# Patient Record
Sex: FEMALE | Race: BLACK OR AFRICAN AMERICAN | Employment: FULL TIME | URBAN - METROPOLITAN AREA
[De-identification: names, ages, dates, MRNs, and addresses within clinical notes are randomized per-mention and may not be internally consistent; named-entity substitution may affect disease eponyms.]

---

## 2017-12-17 ENCOUNTER — HOSPITAL ENCOUNTER (EMERGENCY)
Age: 32
Discharge: HOME | End: 2017-12-17
Attending: EMERGENCY MEDICINE
Payer: SELF-PAY

## 2017-12-17 ENCOUNTER — APPOINTMENT (OUTPATIENT)
Dept: GENERAL RADIOLOGY | Age: 32
End: 2017-12-17
Attending: PHYSICIAN ASSISTANT
Payer: SELF-PAY

## 2017-12-17 VITALS
HEART RATE: 86 BPM | TEMPERATURE: 99.2 F | DIASTOLIC BLOOD PRESSURE: 74 MMHG | RESPIRATION RATE: 18 BRPM | BODY MASS INDEX: 25.03 KG/M2 | OXYGEN SATURATION: 100 % | HEIGHT: 69 IN | SYSTOLIC BLOOD PRESSURE: 120 MMHG | WEIGHT: 169 LBS

## 2017-12-17 DIAGNOSIS — J20.9 ACUTE BRONCHITIS, UNSPECIFIED ORGANISM: Primary | ICD-10-CM

## 2017-12-17 LAB
FLUAV AG NPH QL IA: NEGATIVE
FLUBV AG NOSE QL IA: NEGATIVE

## 2017-12-17 PROCEDURE — 93005 ELECTROCARDIOGRAM TRACING: CPT

## 2017-12-17 PROCEDURE — 74011250637 HC RX REV CODE- 250/637: Performed by: PHYSICIAN ASSISTANT

## 2017-12-17 PROCEDURE — 77030029684 HC NEB SM VOL KT MONA -A

## 2017-12-17 PROCEDURE — 87804 INFLUENZA ASSAY W/OPTIC: CPT | Performed by: PHYSICIAN ASSISTANT

## 2017-12-17 PROCEDURE — 94640 AIRWAY INHALATION TREATMENT: CPT

## 2017-12-17 PROCEDURE — 74011000250 HC RX REV CODE- 250: Performed by: PHYSICIAN ASSISTANT

## 2017-12-17 PROCEDURE — 71020 XR CHEST PA LAT: CPT

## 2017-12-17 PROCEDURE — 99283 EMERGENCY DEPT VISIT LOW MDM: CPT

## 2017-12-17 RX ORDER — ALBUTEROL SULFATE 90 UG/1
2 AEROSOL, METERED RESPIRATORY (INHALATION)
Qty: 1 INHALER | Refills: 0 | Status: SHIPPED | OUTPATIENT
Start: 2017-12-17 | End: 2021-07-29

## 2017-12-17 RX ORDER — BUTALBITAL, ACETAMINOPHEN AND CAFFEINE 50; 325; 40 MG/1; MG/1; MG/1
1 TABLET ORAL
Status: COMPLETED | OUTPATIENT
Start: 2017-12-17 | End: 2017-12-17

## 2017-12-17 RX ORDER — IPRATROPIUM BROMIDE AND ALBUTEROL SULFATE 2.5; .5 MG/3ML; MG/3ML
3 SOLUTION RESPIRATORY (INHALATION)
Status: COMPLETED | OUTPATIENT
Start: 2017-12-17 | End: 2017-12-17

## 2017-12-17 RX ORDER — CODEINE PHOSPHATE AND GUAIFENESIN 10; 100 MG/5ML; MG/5ML
5 SOLUTION ORAL
Qty: 120 ML | Refills: 0 | Status: SHIPPED | OUTPATIENT
Start: 2017-12-17 | End: 2021-07-29

## 2017-12-17 RX ADMIN — IPRATROPIUM BROMIDE AND ALBUTEROL SULFATE 3 ML: .5; 3 SOLUTION RESPIRATORY (INHALATION) at 17:28

## 2017-12-17 RX ADMIN — BUTALBITAL, ACETAMINOPHEN AND CAFFEINE 1 TABLET: 50; 325; 40 TABLET ORAL at 17:27

## 2017-12-17 NOTE — DISCHARGE INSTRUCTIONS
Bronchitis: Care Instructions  Your Care Instructions    Bronchitis is inflammation of the bronchial tubes, which carry air to the lungs. The tubes swell and produce mucus, or phlegm. The mucus and inflamed bronchial tubes make you cough. You may have trouble breathing. Most cases of bronchitis are caused by viruses like those that cause colds. Antibiotics usually do not help and they may be harmful. Bronchitis usually develops rapidly and lasts about 2 to 3 weeks in otherwise healthy people. Follow-up care is a key part of your treatment and safety. Be sure to make and go to all appointments, and call your doctor if you are having problems. It's also a good idea to know your test results and keep a list of the medicines you take. How can you care for yourself at home? · Take all medicines exactly as prescribed. Call your doctor if you think you are having a problem with your medicine. · Get some extra rest.  · Take an over-the-counter pain medicine, such as acetaminophen (Tylenol), ibuprofen (Advil, Motrin), or naproxen (Aleve) to reduce fever and relieve body aches. Read and follow all instructions on the label. · Do not take two or more pain medicines at the same time unless the doctor told you to. Many pain medicines have acetaminophen, which is Tylenol. Too much acetaminophen (Tylenol) can be harmful. · Take an over-the-counter cough medicine that contains dextromethorphan to help quiet a dry, hacking cough so that you can sleep. Avoid cough medicines that have more than one active ingredient. Read and follow all instructions on the label. · Breathe moist air from a humidifier, hot shower, or sink filled with hot water. The heat and moisture will thin mucus so you can cough it out. · Do not smoke. Smoking can make bronchitis worse. If you need help quitting, talk to your doctor about stop-smoking programs and medicines. These can increase your chances of quitting for good.   When should you call for help? Call 911 anytime you think you may need emergency care. For example, call if:  ? · You have severe trouble breathing. ?Call your doctor now or seek immediate medical care if:  ? · You have new or worse trouble breathing. ? · You cough up dark brown or bloody mucus (sputum). ? · You have a new or higher fever. ? · You have a new rash. ? Watch closely for changes in your health, and be sure to contact your doctor if:  ? · You cough more deeply or more often, especially if you notice more mucus or a change in the color of your mucus. ? · You are not getting better as expected. Where can you learn more? Go to http://asia-charles.info/. Enter H333 in the search box to learn more about \"Bronchitis: Care Instructions. \"  Current as of: May 12, 2017  Content Version: 11.4  © 5023-9060 [employer]. Care instructions adapted under license by [employer] (which disclaims liability or warranty for this information). If you have questions about a medical condition or this instruction, always ask your healthcare professional. [address] any warranty or liability for your use of this information.
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
## 2017-12-17 NOTE — LETTER
[facility] EMERGENCY DEPT 
[address] [place] [id number] 
[phone] Work/School Note Date: 12/17/2017 To Whom It May concern: 
 
Irina Arreola was seen and treated today in the emergency room by the following provider(s): 
Attending Provider: Juli Srivastava MD 
Physician Assistant: Len Rabago PA-C. Irina Arreola may return to work on 12/20/17. Sincerely, Len Rabago PA-C

## 2017-12-18 LAB
ATRIAL RATE: 84 BPM
CALCULATED P AXIS, ECG09: 50 DEGREES
CALCULATED R AXIS, ECG10: 57 DEGREES
CALCULATED T AXIS, ECG11: 27 DEGREES
DIAGNOSIS, 93000: NORMAL
P-R INTERVAL, ECG05: 154 MS
Q-T INTERVAL, ECG07: 336 MS
QRS DURATION, ECG06: 68 MS
QTC CALCULATION (BEZET), ECG08: 397 MS
VENTRICULAR RATE, ECG03: 84 BPM

## 2018-11-08 ENCOUNTER — HOSPITAL ENCOUNTER (EMERGENCY)
Age: 33
Discharge: HOME | End: 2018-11-08
Attending: EMERGENCY MEDICINE
Payer: SELF-PAY

## 2018-11-08 ENCOUNTER — APPOINTMENT (OUTPATIENT)
Dept: CT IMAGING | Age: 33
End: 2018-11-08
Attending: EMERGENCY MEDICINE
Payer: SELF-PAY

## 2018-11-08 VITALS
DIASTOLIC BLOOD PRESSURE: 72 MMHG | HEART RATE: 76 BPM | OXYGEN SATURATION: 100 % | SYSTOLIC BLOOD PRESSURE: 121 MMHG | RESPIRATION RATE: 16 BRPM | TEMPERATURE: 98.8 F

## 2018-11-08 DIAGNOSIS — R51.9 NONINTRACTABLE HEADACHE, UNSPECIFIED CHRONICITY PATTERN, UNSPECIFIED HEADACHE TYPE: Primary | ICD-10-CM

## 2018-11-08 LAB — HCG SERPL QL: NEGATIVE

## 2018-11-08 PROCEDURE — 99284 EMERGENCY DEPT VISIT MOD MDM: CPT

## 2018-11-08 PROCEDURE — 96361 HYDRATE IV INFUSION ADD-ON: CPT

## 2018-11-08 PROCEDURE — 74011250636 HC RX REV CODE- 250/636: Performed by: EMERGENCY MEDICINE

## 2018-11-08 PROCEDURE — 70450 CT HEAD/BRAIN W/O DYE: CPT

## 2018-11-08 PROCEDURE — 96374 THER/PROPH/DIAG INJ IV PUSH: CPT

## 2018-11-08 PROCEDURE — 84703 CHORIONIC GONADOTROPIN ASSAY: CPT

## 2018-11-08 PROCEDURE — 96375 TX/PRO/DX INJ NEW DRUG ADDON: CPT

## 2018-11-08 RX ORDER — METOCLOPRAMIDE HYDROCHLORIDE 5 MG/ML
10 INJECTION INTRAMUSCULAR; INTRAVENOUS ONCE
Status: COMPLETED | OUTPATIENT
Start: 2018-11-08 | End: 2018-11-08

## 2018-11-08 RX ORDER — DIPHENHYDRAMINE HYDROCHLORIDE 50 MG/ML
25 INJECTION, SOLUTION INTRAMUSCULAR; INTRAVENOUS ONCE
Status: COMPLETED | OUTPATIENT
Start: 2018-11-08 | End: 2018-11-08

## 2018-11-08 RX ORDER — METOCLOPRAMIDE HYDROCHLORIDE 5 MG/ML
5 INJECTION INTRAMUSCULAR; INTRAVENOUS EVERY 6 HOURS
Status: DISCONTINUED | OUTPATIENT
Start: 2018-11-08 | End: 2018-11-08

## 2018-11-08 RX ADMIN — METOCLOPRAMIDE 10 MG: 5 INJECTION, SOLUTION INTRAMUSCULAR; INTRAVENOUS at 08:49

## 2018-11-08 RX ADMIN — SODIUM CHLORIDE 1000 ML: 900 INJECTION, SOLUTION INTRAVENOUS at 08:46

## 2018-11-08 RX ADMIN — DIPHENHYDRAMINE HYDROCHLORIDE 25 MG: 50 INJECTION, SOLUTION INTRAMUSCULAR; INTRAVENOUS at 08:47

## 2018-11-08 NOTE — DISCHARGE INSTRUCTIONS

## 2018-11-08 NOTE — ED NOTES
I have reviewed discharge instructions with the patient. The patient verbalized understanding, pain reports that pain level is tolerable, given work note.
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
## 2018-11-08 NOTE — ED TRIAGE NOTES
\"I have a migraine and it started 2 days ago and it makes me vomit. It's the worse headache I've ever had. \"

## 2018-11-08 NOTE — LETTER
NOTIFICATION RETURN TO WORK / SCHOOL 
 
11/8/2018 10:10 AM 
 
Ms. Bharat Ramos 
[address] [facility] [address] [id number] To Whom It May Concern: 
 
Bharat Ramos is currently under the care of [facility] EMERGENCY DEPT. She will return to work/school on: 11/9/2018 If there are questions or concerns please have the patient contact our office.  
 
 
 
Sincerely,

## 2021-07-29 ENCOUNTER — VIRTUAL VISIT (OUTPATIENT)
Dept: FAMILY MEDICINE CLINIC | Age: 36
End: 2021-07-29
Payer: MEDICAID

## 2021-07-29 DIAGNOSIS — G89.29 CHRONIC PAIN OF TOE OF LEFT FOOT: ICD-10-CM

## 2021-07-29 DIAGNOSIS — F41.0 PANIC DISORDER: ICD-10-CM

## 2021-07-29 DIAGNOSIS — G89.29 CHRONIC RIGHT-SIDED LOW BACK PAIN WITHOUT SCIATICA: ICD-10-CM

## 2021-07-29 DIAGNOSIS — M79.675 CHRONIC PAIN OF TOE OF LEFT FOOT: ICD-10-CM

## 2021-07-29 DIAGNOSIS — M54.50 CHRONIC RIGHT-SIDED LOW BACK PAIN WITHOUT SCIATICA: ICD-10-CM

## 2021-07-29 DIAGNOSIS — F17.210 CIGARETTE NICOTINE DEPENDENCE WITHOUT COMPLICATION: Primary | ICD-10-CM

## 2021-07-29 PROCEDURE — 99203 OFFICE O/P NEW LOW 30 MIN: CPT | Performed by: INTERNAL MEDICINE
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
## 2021-08-12 ENCOUNTER — HOSPITAL ENCOUNTER (OUTPATIENT)
Dept: PHYSICAL THERAPY | Age: 36
Discharge: HOME | End: 2021-08-12
Attending: INTERNAL MEDICINE
Payer: MEDICAID

## 2021-08-12 PROCEDURE — 97162 PT EVAL MOD COMPLEX 30 MIN: CPT
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
## 2021-08-12 NOTE — PROGRESS NOTES
[address] [facility] PHYSICAL THERAPY  [address], [facility], [address] - Phone: [phone]  Fax: [phone] / [address] [facility]  Patient Name: Bere Umanzor : 1985   Medical   Diagnosis: Low back pain [M54.5] Treatment Diagnosis: Right sided LBP without sciatica   Onset Date: ~ 4 months ago     Referral Source: Ashley Ochoa MD Start of Care [facility]): 2021   Prior Hospitalization: See medical history Provider #: [id number]   Prior Level of Function: independent with chronic LBP   Comorbidities: Depression, asthma   Medications: Verified on Patient Summary List   The Plan of Care and following information is based on the information from the initial evaluation.   ===========================================================================================  Assessment / key information:  Patient is a 30y.o. year old female with chief complaint of right sided low back and hip pain. Patient reports the pain began after sleeping on a hotel bed while transitioning to a new apartment. She reports increased pain and difficulty with sitting (states she cannot sit > 5 minutes due to pain). She reports decreased pain with walking. Objective findings: 1) decreased lumbar AROM, 2) decreased strength with B hip extension  SI joint provocation tests negative. She had a good response with repeated extension exercises (pain reduced from 8/10 to 4/10 with extension exercises during eval). Patient will benefit from skilled physical therapy services to address these issues. Thank you for this referral.          L/S ROM      Range   Effect  Strength (MMT)          Right        Left    Flex 75% p! Psoas (L2,3) 5 5   Ext 75% Dec p!  Quads (L3)       R-lat flex     Ant tib(L4)       L-lat flex     Ext Kraft (L4,L5)       R-rot     Glut Med(L5) 5 5   L-rot     Peroneals (L5,S1)             Hamstrings(S1,S2)        Strength (MMT)       Right     Left          Gastroc (S1,S2)       Glut Max (S1,S2) 3 3           Core: Sup Bridge       Core: Side Bridge       Core: Prone plank                  Special Tests                       Right     Left          Flexibility         Right              Left    Slump neg neg 90/90 HS WNL WNL   SLR neg neg Ely (+) (+)         Tahir       Sl screen 3/5=70% LR     Winston neg neg   Gaenslen test  neg  neg Hip IR (prone)       Rajendra/NU sign     Hip ER (prone)       Thigh thrust  neg  neg         Distraction  neg  neg         Lateral Compression  neg  neg                          ===========================================================================================  Eval Complexity: History: MEDIUM  Complexity : 1-2 comorbidities / personal factors will impact the outcome/ POC Exam:MEDIUM Complexity : 3 Standardized tests and measures addressing body structure, function, activity limitation and / or participation in recreation  Presentation: MEDIUM Complexity : Evolving with changing characteristics  Clinical Decision Making:Other outcome measures clinician experience  MEDIUMOverall Complexity:MEDIUM    Problem List: pain affecting function, decrease ROM, decrease strength, decrease ADL/ functional abilitiies, decrease activity tolerance, decrease flexibility/ joint mobility and decrease transfer abilities   Treatment Plan may include any combination of the following: Therapeutic exercise, Therapeutic activities, Neuromuscular re-education, Physical agent/modality, Gait/balance training, Manual therapy and Patient education  Patient / Family readiness to learn indicated by: asking questions, trying to perform skills and interest  Persons(s) to be included in education: patient (P)  Barriers to Learning/Limitations: None  Measures taken:    Patient Goal (s): \"no more pain and movement without pain\"   Patient self reported health status: good  Rehabilitation Potential: good   Short Term Goals: To be accomplished in  2  weeks:  1. Patient will be compliant with home exercise program.     Long Term Goals: To be accomplished in  4  weeks:  1. Patient will report increased sitting tolerance to > 30 minutes without increased pain to perform ADL's. .  2.  Patient will report ability to perform her normal ADL's with a 75% decrease in pain. 3.  Patient will be independent with home exercise program for self management of symptoms. Frequency / Duration:   Patient to be seen  2  times per week for 4  weeks:  Patient / Caregiver education and instruction: self care and exercises    Therapist Signature: Gurmeet Conely PT Date: 8/78/6128   Certification Period: NA Time: 2:37 PM   ===========================================================================================  I certify that the above Physical Therapy Services are being furnished while the patient is under my care. I agree with the treatment plan and certify that this therapy is necessary. Physician Signature:        Date:       Time:                                         Maye Mcguire MD    Please sign and return to [employer] or you may fax the signed copy to [phone]. Thank you. To ensure your patient receives the highest quality care and to avoid disruption in therapy please sign and return this plan of care within 21 days. Per Medicaid guidelines if the plan of care is not received within 21 days the patient's care must be put on hold until signed.

## 2021-08-18 ENCOUNTER — HOSPITAL ENCOUNTER (OUTPATIENT)
Dept: PHYSICAL THERAPY | Age: 36
Discharge: HOME | End: 2021-08-18
Attending: INTERNAL MEDICINE
Payer: MEDICAID

## 2021-08-18 PROCEDURE — 97530 THERAPEUTIC ACTIVITIES: CPT

## 2021-08-18 PROCEDURE — 97112 NEUROMUSCULAR REEDUCATION: CPT

## 2021-08-18 PROCEDURE — 97110 THERAPEUTIC EXERCISES: CPT

## 2021-08-18 NOTE — PROGRESS NOTES
PHYSICAL THERAPY - DAILY TREATMENT NOTE    Patient Name: Gregoria Batista        Date: 2021  : 1985   yes Patient  Verified  Visit #:  2   of   8  Insurance: Payor: Sharon Gale / Plan: [address] [place] [address] / Product Type: Managed Care Medicaid /      In time: 193 Out time:    Total Treatment Time: 56     TREATMENT AREA =  Low back pain [M54.5]    SUBJECTIVE  Pain Level (on 0 to 10 scale):  7  / 10   Medication Changes/New allergies or changes in medical history, any new surgeries or procedures?    no  If yes, update Summary List   Subjective Functional Status/Changes:  []  No changes reported     \"I did the press ups every 2 hrs, they hurt.  I feel it all day bc I and bending over\"          OBJECTIVE  Modalities Rationale:     decrease pain to improve patient's ability to safely perform ADLs, bending/stooping/ lifting; perform prolong sitting/standing/ambulation; and negotiate stairs with no pain or limitations    min [] Estim, type/location:                                      []  att     []  unatt     []  w/US     []  w/ice    []  w/heat    min []  Mechanical Traction: type/lbs                   []  pro   []  sup   []  int   []  cont    []  before manual    []  after manual    min []  Ultrasound, settings/location:      min []  Iontophoresis w/ dexamethasone, location:                                               []  take home patch       []  in clinic   10 min []  Ice     [x]  Heat    location/position: prone with wedge under LEs   [x]Skin assessment post-treatment (if applicable):   [x]  intact    []  redness- no adverse reaction                  []redness  adverse reaction:      21 min Therapeutic Exercise:  [x]  See flow sheet   Rationale:      increase ROM, increase strength and improve coordination to improve the patients ability to safely perform ADLs, bending/stooping/ lifting; perform prolong sitting/standing/ambulation; and negotiate stairs with no pain or limitations      10 min Therapeutic Activity: [x]  See flow sheet   Rationale:    increase ROM, increase strength and improve coordination to improve the patients ability to safely perform ADLs, bending/stooping/ lifting; perform prolong sitting/standing/ambulation; and negotiate stairs with no pain or limitations     15 min Neuromuscular Re-ed: [x]  See flow sheet   Rationale:    increase ROM, increase strength and improve coordination to improve the patients ability to safely perform ADLs, bending/stooping/ lifting; perform prolong sitting/standing/ambulation; and negotiate stairs with no pain or limitations     Billed With/As:   [x] TE   [x] TA   [x] Neuro   [] Self Care Patient Education: [x] Review HEP    [] Progressed/Changed HEP based on:   [x] positioning   [x] body mechanics   [x] transfers   [] heat/ice application    [x] other: Pt ed on importance and benefits of compliance with HEP, core strength/stability and proper posture; pt verbalized understanding  See updated HEP in chart       Other Objective/Functional Measures:  JAMEE> REIL- centralized to L/s 6/10    VCs + demo to perform proper technique for TE  Initiated TE per flowsheet without c/o p! Reviewed proper bed mobility, sleeping positions, lifting techniques and importance and benefits of a neutral spine       Post Treatment Pain Level (on 0 to 10) scale:   4  / 10     ASSESSMENT  Assessment/Changes in Function:   Progressed TE without c/o increase p! []  See Progress Note/Recertification   Patient will continue to benefit from skilled PT services to modify and progress therapeutic interventions, address functional mobility deficits, address ROM deficits, address strength deficits, analyze and address soft tissue restrictions, analyze and cue movement patterns, analyze and modify body mechanics/ergonomics, assess and modify postural abnormalities and instruct in home and community integration to attain remaining goals.    Progress toward goals / Updated goals:  Pt's first visit since [facility], no noted progress        PLAN  [x]  Upgrade activities as tolerated yes Continue plan of care   []  Discharge due to :    []  Other:      Therapist: Yaw Quiñones PTA    Date: 8/18/2021 Time: 2:34 PM     Future Appointments   Date Time Provider Flakita Jones   8/23/2021 11:45 AM Yaron Sibley, PTA [facility]   8/25/2021 11:45 AM Linn De Souza, PT [facility]   8/30/2021 11:45 AM Linn De Souza, PT [facility]   9/1/2021 11:45 AM Yaron Sibley, PTA [facility]   9/8/2021 11:45 AM Yaron Siblye, PTA [facility]   9/13/2021 11:45 AM Yaron Sibley, PTA [facility]   9/15/2021 11:45 AM Yaron Sibley, PTA [facility]   9/20/2021 11:45 AM Yaron Sibley, PTA [facility]   9/22/2021 11:45 AM Yaron Sibley, PTA [facility]   9/27/2021 11:45 AM Erma Davis, PTA [facility]   9/29/2021 11:45 AM Erma Davis, PTA [facility]

## 2021-08-23 ENCOUNTER — HOSPITAL ENCOUNTER (OUTPATIENT)
Dept: PHYSICAL THERAPY | Age: 36
Discharge: HOME | End: 2021-08-23
Attending: INTERNAL MEDICINE
Payer: MEDICAID

## 2021-08-23 PROCEDURE — 97014 ELECTRIC STIMULATION THERAPY: CPT

## 2021-08-23 PROCEDURE — 97116 GAIT TRAINING THERAPY: CPT

## 2021-08-23 PROCEDURE — 97110 THERAPEUTIC EXERCISES: CPT

## 2021-08-23 PROCEDURE — 97112 NEUROMUSCULAR REEDUCATION: CPT

## 2021-08-23 PROCEDURE — 97530 THERAPEUTIC ACTIVITIES: CPT
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
## 2021-08-25 ENCOUNTER — HOSPITAL ENCOUNTER (OUTPATIENT)
Dept: PHYSICAL THERAPY | Age: 36
Discharge: HOME | End: 2021-08-25
Attending: INTERNAL MEDICINE
Payer: MEDICAID

## 2021-08-25 PROCEDURE — 97110 THERAPEUTIC EXERCISES: CPT

## 2021-08-25 PROCEDURE — 97530 THERAPEUTIC ACTIVITIES: CPT

## 2021-08-25 PROCEDURE — 97112 NEUROMUSCULAR REEDUCATION: CPT

## 2021-08-25 NOTE — PROGRESS NOTES
PHYSICAL THERAPY - DAILY TREATMENT NOTE    Patient Name: Elenora Harada        Date: 2021  : 1985   YES Patient  Verified  Visit #:   4   of   8  Insurance: Payor: [employer] / Plan: [facility] / Product Type: Managed Care Medicaid /      In time: 11:49 Out time: 12:24   Total Treatment Time: 35     Medicare/BCBS [place] Time Tracking (below)   Total Timed Codes (min):  NA 1:1 Treatment Time:  NA     TREATMENT AREA =  Low back pain [M54.5]  SUBJECTIVE    Pain Level (on 0 to 10 scale):  7  / 10   Medication Changes/New allergies or changes in medical history, any new surgeries or procedures? NO    If yes, update Summary List   Subjective Functional Status/Changes:  []  No changes reported     Pt reports that she did not like e-stim last session and does not want to do it again.           OBJECTIVE      15 min Therapeutic Exercise:  [x]  See flow sheet   Rationale:      increase ROM, increase strength and decrease symptoms to improve the patients ability to perform ADLs/IADLs, functional mobility and gait safely and independently without increased pain/symptoms     8 min Therapeutic Activity: See flow sheet   Rationale:  increase ROM, increase strength and decrease symptoms to improve the patients ability to perform ADLs/IADLs, functional mobility and gait safely and independently without increased pain/symptoms     12 min Neuromuscular Re-ed: See flow sheet   Rationale:  increase ROM, increase strength and decrease symptoms to improve the patients ability to perform ADLs/IADLs, functional mobility and gait safely and independently without increased pain/symptoms      During TE/TA/NM min Patient Education:  YES  Reviewed HEP   [x]  Progressed/Changed HEP based on:   Cont HEP - provided updated HEP this session     Other Objective/Functional Measures:    Exercises per flow sheet  Held prone TKE and REIL due to c/o increased pain  Added LTR, stand hip ext       Post Treatment Pain Level (on 0 to 10) scale:   6 / 10     ASSESSMENT    Assessment/Changes in Function:     Tolerated exercises with exception of prone TKE and REIL without c/o increased pain     []  See Progress Note/Recertification   Patient will continue to benefit from skilled PT services to modify and progress therapeutic interventions, address functional mobility deficits, address ROM deficits, address strength deficits, analyze and address soft tissue restrictions, analyze and cue movement patterns, analyze and modify body mechanics/ergonomics, assess and modify postural abnormalities and instruct in home and community integration to attain remaining goals. Progress toward goals / Updated goals:    Progressing toward goals: · Short Term Goals: To be accomplished in  2  weeks:  1. Patient will be compliant with home exercise program. - Updated this session     · Long Term Goals: To be accomplished in  4  weeks:  1. Patient will report increased sitting tolerance to > 30 minutes without increased pain to perform ADL's. .  2.  Patient will report ability to perform her normal ADL's with a 75% decrease in pain. 3.  Patient will be independent with home exercise program for self management of symptoms.        PLAN    [x]  Upgrade activities as tolerated YES Continue plan of care   []  Discharge due to :    []  Other:      Therapist: Rmairo Lares PT    Date: 8/25/2021 Time: 11:56 AM     Future Appointments   Date Time Provider Flakita Jones   8/30/2021 11:45 AM Miguelito Barnes PT [facility]   9/1/2021 11:45 AM Cori Quach PTA [facility]   9/8/2021 11:45 AM Cori Quach PTA [facility]   9/13/2021 11:45 AM Cori Quach PTA [facility]   9/15/2021 11:45 AM Cori Quach PTA [facility]   9/20/2021 11:45 AM Cori Quach PTA [facility]   9/22/2021 11:45 AM Cori Quach PTA [facility]   9/27/2021 11:45 AM Karyna Davis, PTA [facility]   9/29/2021 11:45 AM Erma Davis, LIONEL [facility]

## 2021-08-30 ENCOUNTER — HOSPITAL ENCOUNTER (OUTPATIENT)
Dept: PHYSICAL THERAPY | Age: 36
Discharge: HOME | End: 2021-08-30
Attending: INTERNAL MEDICINE
Payer: MEDICAID

## 2021-08-30 PROCEDURE — 97530 THERAPEUTIC ACTIVITIES: CPT

## 2021-08-30 PROCEDURE — 97110 THERAPEUTIC EXERCISES: CPT

## 2021-08-30 PROCEDURE — 97112 NEUROMUSCULAR REEDUCATION: CPT

## 2021-08-30 PROCEDURE — 97116 GAIT TRAINING THERAPY: CPT

## 2021-08-30 NOTE — PROGRESS NOTES
PHYSICAL THERAPY - DAILY TREATMENT NOTE    Patient Name: Yudelka Calvo        Date: 2021  : 1985   yes Patient  Verified  Visit #:  5  of   8  Insurance: Payor: [facility] / Plan: [address] [facility] / Product Type: Managed Care Medicaid /      In time: 8380 Out time: 8848   Total Treatment Time: 60     TREATMENT AREA =  Low back pain [M54.5]    SUBJECTIVE  Pain Level (on 0 to 10 scale): 5 / 10   Medication Changes/New allergies or changes in medical history, any new surgeries or procedures?    no  If yes, update Summary List   Subjective Functional Status/Changes:  []  No changes reported     \"I feel good when I do the FRIEDA, its the best one I do\"     OBJECTIVE  Modalities Rationale:     decrease pain to improve patient's ability to safely perform ADLs, bending/stooping/ lifting; perform prolong sitting/standing/ambulation; and negotiate stairs with no pain or limitations    min [] Estim, type/location:                                     []  att     []  unatt     []  w/US     []  w/ice    []  w/heat    min []  Mechanical Traction: type/lbs                   []  pro   []  sup   []  int   []  cont    []  before manual    []  after manual    min []  Ultrasound, settings/location:      min []  Iontophoresis w/ dexamethasone, location:                                               []  take home patch       []  in clinic   10 min []  Ice     [x]  Heat    location/position: supine with wedge under LEs   [x]Skin assessment post-treatment (if applicable):   [x]  intact    []  redness- no adverse reaction                  []redness  adverse reaction:      15 min Therapeutic Exercise:  [x]  See flow sheet   Rationale:      increase ROM, increase strength and improve coordination to improve the patients ability to safely perform ADLs, bending/stooping/ lifting; perform prolong sitting/standing/ambulation; and negotiate stairs with no pain or limitations      10 min Therapeutic Activity: [x]  See flow sheet   Rationale:    increase ROM, increase strength and improve coordination to improve the patients ability to safely perform ADLs, bending/stooping/ lifting; perform prolong sitting/standing/ambulation; and negotiate stairs with no pain or limitations     15 min Neuromuscular Re-ed: [x]  See flow sheet   Rationale:    increase ROM, increase strength and improve coordination to improve the patients ability to safely perform ADLs, bending/stooping/ lifting; perform prolong sitting/standing/ambulation; and negotiate stairs with no pain or limitations   10 min Gait Training: (I) with suitcase carry x 60' ea UE with 5#  (I) with farmers carry x 61' ea UE with 5# KB/10 #KB   (I) with HK amb x 60' with 5# KB     Rationale:     Billed With/As:   [x] TE   [x] TA   [x] Neuro   [] Self Care Patient Education: [x] Review HEP    [] Progressed/Changed HEP based on:   [x] positioning   [x] body mechanics   [x] transfers   [] heat/ice application    [x] other: Pt ed on importance and benefits of compliance with HEP, core strength/stability and proper posture; pt verbalized understanding       Other Objective/Functional Measures:  FRIEDA with OP- B/B reduced pain to 8/10  FRIEDA HOC left- NE    VCs + demo to perform proper technique for TE  initiated hip abd in standing, seated LAQs on DD and minisquats without c/o p! VCs to decrease both knee anterior translation with squats   Post Treatment Pain Level (on 0 to 10) scale:   5 / 10     ASSESSMENT  Assessment/Changes in Function:   demos upright posture on DD performing LAQs  demos proper minisquat with no increase pain   increased to GTB with clams without c/o p!    []  See Progress Note/Recertification   Patient will continue to benefit from skilled PT services to modify and progress therapeutic interventions, address functional mobility deficits, address ROM deficits, address strength deficits, analyze and address soft tissue restrictions, analyze and cue movement patterns, analyze and modify body mechanics/ergonomics, assess and modify postural abnormalities and instruct in home and community integration to attain remaining goals. Progress toward goals / Updated goals: · Short Term Goals: To be accomplished in  2  weeks:  1. Patient will be compliant with home exercise program. MET    Long Term Goals: To be accomplished in  4  weeks:  1. Patient will report increased sitting tolerance to > 30 minutes without increased pain to perform ADL's.  2.  Patient will report ability to perform her normal ADL's with a 75% decrease in pain.   3.  Patient will be independent with home exercise program for self management of symptoms.        PLAN  [x]  Upgrade activities as tolerated yes Continue plan of care   []  Discharge due to :    []  Other:      Therapist: Abhi Mejia PTA    Date: 8/30/2021 Time: 1:30 PM     Future Appointments   Date Time Provider Flakita Jones   9/1/2021 11:45 AM Ree Higginbotham, LIONEL [facility]   9/8/2021 11:45 AM Ree Higginbotham, LIONEL [facility]   9/13/2021 11:45 AM Ree Higginbotham, PTA [facility]   9/15/2021 11:45 AM Ree Higginbotham, PTA [facility]   9/20/2021 11:45 AM Reemoris Higginbotham, PTA [facility]   9/22/2021 11:45 AM Ree Higginbotham, PTA [facility]   9/27/2021 11:45 AM Erma Davis, PTA [facility]   9/29/2021 11:45 AM Erma Davis, PTA [facility]

## 2021-09-01 ENCOUNTER — HOSPITAL ENCOUNTER (OUTPATIENT)
Dept: PHYSICAL THERAPY | Age: 36
Discharge: HOME | End: 2021-09-01
Attending: INTERNAL MEDICINE
Payer: MEDICAID

## 2021-09-01 PROCEDURE — 97110 THERAPEUTIC EXERCISES: CPT

## 2021-09-01 PROCEDURE — 97530 THERAPEUTIC ACTIVITIES: CPT

## 2021-09-01 PROCEDURE — 97116 GAIT TRAINING THERAPY: CPT

## 2021-09-01 PROCEDURE — 97112 NEUROMUSCULAR REEDUCATION: CPT

## 2021-09-01 NOTE — PROGRESS NOTES
PHYSICAL THERAPY - DAILY TREATMENT NOTE    Patient Name: Senia Quiñones        Date: 2021  : 1985   yes Patient  Verified  Visit #:  6  of   8  Insurance: Payor: [facility] / Plan: [address] [facility] / Product Type: Managed Care Medicaid /      In time: 8042 Out time: 46   Total Treatment Time: 51     TREATMENT AREA =  Low back pain [M54.5]    SUBJECTIVE  Pain Level (on 0 to 10 scale): 6.5 / 10   Medication Changes/New allergies or changes in medical history, any new surgeries or procedures?    no  If yes, update Summary List   Subjective Functional Status/Changes:  []  No changes reported     \"I just get tight in the right side and the pain doesn't completely go away\"     OBJECTIVE  Modalities Rationale:     decrease pain to improve patient's ability to safely perform ADLs, bending/stooping/ lifting; perform prolong sitting/standing/ambulation; and negotiate stairs with no pain or limitations    min [] Estim, type/location:                                     []  att     []  unatt     []  w/US     []  w/ice    []  w/heat    min []  Mechanical Traction: type/lbs                   []  pro   []  sup   []  int   []  cont    []  before manual    []  after manual    min []  Ultrasound, settings/location:      min []  Iontophoresis w/ dexamethasone, location:                                               []  take home patch       []  in clinic   10 min []  Ice     [x]  Heat    location/position: supine with wedge under LEs   [x]Skin assessment post-treatment (if applicable):   [x]  intact    []  redness- no adverse reaction                  []redness  adverse reaction:      11 min Therapeutic Exercise:  [x]  See flow sheet   Rationale:      increase ROM, increase strength and improve coordination to improve the patients ability to safely perform ADLs, bending/stooping/ lifting; perform prolong sitting/standing/ambulation; and negotiate stairs with no pain or limitations      10 min Therapeutic Activity: [x]  See flow sheet   Rationale:    increase ROM, increase strength and improve coordination to improve the patients ability to safely perform ADLs, bending/stooping/ lifting; perform prolong sitting/standing/ambulation; and negotiate stairs with no pain or limitations     10 min Neuromuscular Re-ed: [x]  See flow sheet   Rationale:    increase ROM, increase strength and improve coordination to improve the patients ability to safely perform ADLs, bending/stooping/ lifting; perform prolong sitting/standing/ambulation; and negotiate stairs with no pain or limitations   10 min Gait Training: (I) with suitcase carry x 60' ea UE with 10#  (I) with farmers carry x 60' ea UE with 15# KB/10 #KB   (I) with HK amb x 60' with 10# KB  (I) SS x 30   Rationale:     Billed With/As:   [x] TE   [x] TA   [x] Neuro   [] Self Care Patient Education: [x] Review HEP    [] Progressed/Changed HEP based on:   [x] positioning   [x] body mechanics   [x] transfers   [] heat/ice application    [x] other: Pt ed on importance and benefits of compliance with HEP, core strength/stability and proper posture; pt verbalized understanding       Other Objective/Functional Measures:  VCs + demo to perform proper technique for TE  initiated SS and marches on DD without c/o p!   demos upright posture on DD without LOB  c/o glut med fatigue with SS amb   Post Treatment Pain Level (on 0 to 10) scale:   5 / 10     ASSESSMENT  Assessment/Changes in Function:   Progressed TE without c/o increase p!  demos proper squat form without increase pain   []  See Progress Note/Recertification   Patient will continue to benefit from skilled PT services to modify and progress therapeutic interventions, address functional mobility deficits, address ROM deficits, address strength deficits, analyze and address soft tissue restrictions, analyze and cue movement patterns, analyze and modify body mechanics/ergonomics, assess and modify postural abnormalities and instruct in home and community integration to attain remaining goals. Progress toward goals / Updated goals: · Short Term Goals: To be accomplished in  2  weeks:  1. Patient will be compliant with home exercise program. MET    Long Term Goals: To be accomplished in  4  weeks:  1. Patient will report increased sitting tolerance to > 30 minutes without increased pain to perform ADL's.  2.  Patient will report ability to perform her normal ADL's with a 75% decrease in pain.   3.  Patient will be independent with home exercise program for self management of symptoms.        PLAN  [x]  Upgrade activities as tolerated yes Continue plan of care   []  Discharge due to :    []  Other:      Therapist: Isaura Delgadillo PTA    Date: 9/1/2021 Time: 1:30 PM     Future Appointments   Date Time Provider Flakita Jones   9/8/2021 11:45 AM Cherylin Innocent, PTA [facility] [address]   9/13/2021 11:45 AM Cherylin Innocent, PTA [facility] [address]   9/15/2021 11:45 AM Cherylin Innocent, PTA [facility] [address]   9/20/2021 11:45 AM Cherylin Innocent, PTA [facility] [address]   9/22/2021 11:45 AM Cherylin Innocent, PTA [facility] [address]   9/27/2021 11:45 AM HenlineErma, PTA [facility] [address]   9/29/2021 11:45 AM Erma Davis, PTA [facility] [address]

## 2021-09-13 ENCOUNTER — HOSPITAL ENCOUNTER (OUTPATIENT)
Dept: PHYSICAL THERAPY | Age: 36
Discharge: HOME | End: 2021-09-13
Attending: INTERNAL MEDICINE
Payer: MEDICAID

## 2021-09-13 PROCEDURE — 97530 THERAPEUTIC ACTIVITIES: CPT

## 2021-09-13 PROCEDURE — 97116 GAIT TRAINING THERAPY: CPT

## 2021-09-13 PROCEDURE — 97112 NEUROMUSCULAR REEDUCATION: CPT

## 2021-09-13 PROCEDURE — 97110 THERAPEUTIC EXERCISES: CPT

## 2021-09-13 NOTE — PROGRESS NOTES
[address] [facility] PHYSICAL THERAPY  [address], [address] - Phone: [phone]  Fax: [phone]  PROGRESS NOTE  Patient Name: Vanda Davison : 1985   Treatment/Medical Diagnosis: Low back pain [M54.5]   Referral Source: Eduar Barriga MD Provider #  [id number]   Date of Initial Visit: 21 Attended Visits: 7 Missed Visits: 2     SUMMARY OF TREATMENT  Patient's POC has consisted of therex, therapeutic activities, manual therapy prn, modalities prn, pt. education, and a comprehensive HEP. Treatment strategies used to address functional mobility deficits, ROM deficits, strength deficits, analyze and address soft tissue restrictions, analyze and cue movement patterns, analyze and modify body mechanics/ergonomics, assess and modify postural abnormalities and instruct in home and community integration. CURRENT STATUS  Irina is progressing towards goals in PT for LBP as evidence by  progressing towards (I) with HEP, increasing improving functional mobility. Pt reports 40% overall improvement in Sxs since [facility]. Pt is limited to 30 mins of sitting and has no limitations with standing or walking . 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 Pt continues to have intermittent sciatica sxs with max p! 9/10; however, pt is able to manage and centralize with repeated/prolong L/s movement (MDT/Neena Method). Pt reports difficulty descending stairs due to decrease LLE strength. Pt is (I) with weighted Gait Training with max of 15# without producing pain. Pt's FOTO=[id number] indicating Stage 4 which exhibits a little difficulty performing usual housework, hobbies and activities. Goal/Measure of Progress Goal Met? 1. Patient will report increased sitting tolerance to > 30 minutes without increased pain to perform ADL's. Status at last Eval: unable Current Status: 30 mins yes   2.   Patient will report ability to perform her normal ADL's with a 75% decrease in pain. Status at last Eval: NT Current Status: 40% decrease in pain progressing   3. Patient will be independent with home exercise program for self management of symptoms   Status at last Eval: Established HEP Current Status: compliant with HEP progressing       New Goals to be achieved in __4__  weeks:  1. Patient will report ability to perform her normal ADL's with a 75% decrease in pain. 2. Patient will be independent with home exercise program for self management of symptoms  3. Pt to descend stairs safely with 1 UE assist with reciprocal pattern      RECOMMENDATIONS  Pt will benefit from further skilled PT services 2x wk x 4 wks to increase strength/stability and decrease sxs to promote functional mobility. If you have any questions/comments please contact us directly at [phone]. Thank you for allowing us to assist in the care of your patient. LPTA Signature: Wild Roland PTA  Date: 9/13/2021   PT Signature: VIKTORIA Tam Time: 4:26 PM   NOTE TO PHYSICIAN:  PLEASE COMPLETE THE ORDERS BELOW AND FAX TO   [facility]: [phone]. If you are unable to process this request in 24 hours please contact our office: [phone].    ___ I have read the above report and request that my patient continue as recommended.   ___ I have read the above report and request that my patient continue therapy with the following changes/special instructions:_________________________________________________________   ___ I have read the above report and request that my patient be discharged from therapy.      Physician Signature:        Date:       Time:                                  Josué Fu MD

## 2021-09-13 NOTE — PROGRESS NOTES
PHYSICAL THERAPY - DAILY TREATMENT NOTE    Patient Name: Brenda Schwartz        Date: 2021  : 1985   yes Patient  Verified  Visit #:  7  of   8  Insurance: Payor: Andreea Powell julián / Plan:  [facility] / Product Type: Managed Care Medicaid /      In time: 908 Out time:    Total Treatment Time: 60     TREATMENT AREA =  Low back pain [M54.5]    SUBJECTIVE  Pain Level (on 0 to 10 scale): \"stiff\" / 10   Medication Changes/New allergies or changes in medical history, any new surgeries or procedures?    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 five min breaks     OBJECTIVE  Modalities Rationale:     decrease pain to improve patient's ability to safely perform ADLs, bending/stooping/ lifting; perform prolong sitting/standing/ambulation; and negotiate stairs with no pain or limitations    min [] Estim, type/location:                                     []  att     []  unatt     []  w/US     []  w/ice    []  w/heat    min []  Mechanical Traction: type/lbs                   []  pro   []  sup   []  int   []  cont    []  before manual    []  after manual    min []  Ultrasound, settings/location:      min []  Iontophoresis w/ dexamethasone, location:                                               []  take home patch       []  in clinic   10 min []  Ice     [x]  Heat    location/position: supine with wedge under LEs   [x]Skin assessment post-treatment (if applicable):   [x]  intact    []  redness- no adverse reaction                  []redness  adverse reaction:      16 min Therapeutic Exercise:  [x]  See flow sheet   Rationale:      increase ROM, increase strength and improve coordination to improve the patients ability to safely perform ADLs, bending/stooping/ lifting; perform prolong sitting/standing/ambulation; and negotiate stairs with no pain or limitations      12 min Therapeutic Activity: [x]  See flow sheet   Rationale:    increase ROM, increase strength and improve coordination to improve the patients ability to safely perform ADLs, bending/stooping/ lifting; perform prolong sitting/standing/ambulation; and negotiate stairs with no pain or limitations     10 min Neuromuscular Re-ed: [x]  See flow sheet   Rationale:    increase ROM, increase strength and improve coordination to improve the patients ability to safely perform ADLs, bending/stooping/ lifting; perform prolong sitting/standing/ambulation; and negotiate stairs with no pain or limitations   12 min Gait Training: (I) with Front rack carry with suitcase carry x 60' ea UE with 10# FR, 12# SC   (I) with HK amb x 60' with 12# KB  (I) SS x 30' x 2 with OTB   Rationale:     Billed With/As:   [x] TE   [x] TA   [x] Neuro   [] Self Care Patient Education: [x] Review HEP    [] Progressed/Changed HEP based on:   [x] positioning   [x] body mechanics   [x] transfers   [] heat/ice application    [x] other: Pt ed on importance and benefits of compliance with HEP, core strength/stability and proper posture; pt verbalized understanding       Other Objective/Functional Measures:  VCs + demo to perform proper technique for TE  added 2# to LAQs/marches on DD and added OTB to lateral amb, no pain noted    initiated Front rack carry with Suitcase carry without producing pain   MMT=hip ext =4/5   Post Treatment Pain Level (on 0 to 10) scale:   0 / 10     ASSESSMENT  Assessment/Changes in Function:   See PN  wojciech (I) with GT   []  See Progress Note/Recertification   Patient will continue to benefit from skilled PT services to modify and progress therapeutic interventions, address functional mobility deficits, address ROM deficits, address strength deficits, analyze and address soft tissue restrictions, analyze and cue movement patterns, analyze and modify body mechanics/ergonomics, assess and modify postural abnormalities and instruct in home and community integration to attain remaining goals. Progress toward goals / Updated goals:  See PN          PLAN  [x]  Upgrade activities as tolerated yes Continue plan of care   []  Discharge due to :    [x]  Other: Pt will benefit from further skilled PT services 2x wk x 4 wks to increase strength/stability and decrease sxs to promote functional mobility.      Therapist: Vivian Garcia PTA    Date: 9/13/2021 Time: 4:54 PM     Future Appointments   Date Time Provider Flakita Jones   9/15/2021 11:45 AM Meg Tovar PTA [facility]   9/20/2021 11:45 AM Meg Tovar PTA [facility]   9/22/2021 11:45 AM Meg Tovar PTA [facility]   9/27/2021 11:45 AM Roman Davis PTA [facility]   9/29/2021 11:45 AM Erma Davis PTA [facility]

## 2021-09-15 ENCOUNTER — HOSPITAL ENCOUNTER (OUTPATIENT)
Dept: PHYSICAL THERAPY | Age: 36
Discharge: HOME | End: 2021-09-15
Attending: INTERNAL MEDICINE
Payer: MEDICAID

## 2021-09-15 PROCEDURE — 97112 NEUROMUSCULAR REEDUCATION: CPT

## 2021-09-15 PROCEDURE — 97530 THERAPEUTIC ACTIVITIES: CPT

## 2021-09-15 PROCEDURE — 97116 GAIT TRAINING THERAPY: CPT

## 2021-09-15 PROCEDURE — 97110 THERAPEUTIC EXERCISES: CPT

## 2021-09-15 NOTE — PROGRESS NOTES
PHYSICAL THERAPY - DAILY TREATMENT NOTE    Patient Name: Gregoria Batista        Date: 9/15/2021  : 1985   yes Patient  Verified  Visit #:  8  of   15  Insurance: Payor: MITRA MAYO MEDICAID / Plan: [facility] / Product Type: Managed Care Medicaid /      In time: 4164 Out time: 4841   Total Treatment Time: 59     TREATMENT AREA =  Low back pain [M54.5]    SUBJECTIVE  Pain Level (on 0 to 10 scale): 4.5 / 10   Medication Changes/New allergies or changes in medical history, any new surgeries or procedures?    no  If yes, update Summary List   Subjective Functional Status/Changes:  []  No changes reported     \"I am hurting a little, its there not bad. I feel the back getting stronger. I am lifting when I go back to work.  I need a good back brace recommendation\"     OBJECTIVE  Modalities Rationale:     decrease pain to improve patient's ability to safely perform ADLs, bending/stooping/ lifting; perform prolong sitting/standing/ambulation; and negotiate stairs with no pain or limitations    min [] Estim, type/location:                                     []  att     []  unatt     []  w/US     []  w/ice    []  w/heat    min []  Mechanical Traction: type/lbs                   []  pro   []  sup   []  int   []  cont    []  before manual    []  after manual    min []  Ultrasound, settings/location:      min []  Iontophoresis w/ dexamethasone, location:                                               []  take home patch       []  in clinic   10 min []  Ice     [x]  Heat    location/position: prone with wedge under LEs   [x]Skin assessment post-treatment (if applicable):   [x]  intact    []  redness- no adverse reaction                  []redness - adverse reaction:      9 min Therapeutic Exercise:  [x]  See flow sheet   Rationale:      increase ROM, increase strength and improve coordination to improve the patients ability to safely perform ADLs, bending/stooping/ lifting; perform prolong sitting/standing/ambulation; and negotiate stairs with no pain or limitations      10 min Therapeutic Activity: [x]  See flow sheet   Rationale:    increase ROM, increase strength and improve coordination to improve the patients ability to safely perform ADLs, bending/stooping/ lifting; perform prolong sitting/standing/ambulation; and negotiate stairs with no pain or limitations     10 min Neuromuscular Re-ed: [x]  See flow sheet   Rationale:    increase ROM, increase strength and improve coordination to improve the patients ability to safely perform ADLs, bending/stooping/ lifting; perform prolong sitting/standing/ambulation; and negotiate stairs with no pain or limitations     20 min Gait Training: Dynamic warm up x 60' ea  monsterwalk fwd/retro/lateral with OTB AK/OTB BK  (I) with Front rack carry with suitcase carry x 60' ea UE with 10# FR, 12# SC   Rationale: increase ROM, increase strength and improve coordination to improve the patients ability to safely perform ADLs, bending/stooping/ lifting; perform prolong sitting/standing/ambulation; and negotiate stairs with no pain or limitations     Billed With/As:   [x] TE   [x] TA   [x] Neuro   [] Self Care Patient Education: [x] Review HEP    [] Progressed/Changed HEP based on:   [x] positioning   [x] body mechanics   [x] transfers   [] heat/ice application    [x] other: Pt ed on importance and benefits of compliance with HEP, core strength/stability and proper posture; pt verbalized understanding       Other Objective/Functional Measures:  VCs + demo to perform proper technique for TE    initiated squat IR/ER, monsterwalk, and gastroc str without c/o p!  demos proper squat tech with no pain  fatigued after retro monster, no discomfort      Post Treatment Pain Level (on 0 to 10) scale:   0 / 10     ASSESSMENT  Assessment/Changes in Function:   Progressed TE without c/o increase p!  no limitations today   []  See Progress Note/Recertification   Patient will continue to benefit from skilled PT services to modify and progress therapeutic interventions, address functional mobility deficits, address ROM deficits, address strength deficits, analyze and address soft tissue restrictions, analyze and cue movement patterns, analyze and modify body mechanics/ergonomics, assess and modify postural abnormalities and instruct in home and community integration to attain remaining goals.    Progress toward goals / Updated goals:  See PN last visit, no changes noted at this time     PLAN  [x]  Upgrade activities as tolerated yes Continue plan of care   []  Discharge due to :    []  Other:      Therapist: Abhi Mejia PTA    Date: 9/15/2021 Time: 11:40 AM     Future Appointments   Date Time Provider Flakita Jones   9/15/2021 11:45 AM Ree Higginbotham PTA [facility] [address]   9/20/2021 11:45 AM Ree Higginbotham PTA [facility] [address]   9/22/2021 11:45 AM Ree Higginbotham PTA [facility] [address]   9/27/2021 11:45 AM Narciso Davis PTA [facility] [address]   9/29/2021 11:45 AM Erma Davis PTA [facility] [address]

## 2021-09-20 ENCOUNTER — APPOINTMENT (OUTPATIENT)
Dept: PHYSICAL THERAPY | Age: 36
End: 2021-09-20
Attending: INTERNAL MEDICINE
Payer: MEDICAID

## 2021-12-08 NOTE — PROGRESS NOTES
[address] [place] [address] PHYSICAL THERAPY  [address] [facility], [address] - Phone: [phone]  Fax: [phone]  PROGRESS NOTE  Patient Name: Yuliana Lema : 1985   Treatment/Medical Diagnosis: Low back pain [M54.5]   Referral Source: Noreen North MD Provider #  [id number]   Date of Initial Visit: 21 Attended Visits: 8 Missed Visits: 5     SUMMARY OF TREATMENT  Patient's POC has consisted of therex, therapeutic activities, manual therapy prn, modalities prn, pt. education, and a comprehensive HEP. Treatment strategies used to address functional mobility deficits, ROM deficits, strength deficits, analyze and address soft tissue restrictions, analyze and cue movement patterns, analyze and modify body mechanics/ergonomics, assess and modify postural abnormalities and instruct in home and community integration. CURRENT STATUS  Irina was progressing towards goals in PT; however, after last appt on 9/15/21 missed 3 appts. See pt's progress below from ON 21. Goal/Measure of Progress Goal Met? 1. Patient will report increased sitting tolerance to > 30 minutes without increased pain to perform ADL's. Status at last Eval: unable Current Status: 30 mins yes   2. Patient will report ability to perform her normal ADL's with a 75% decrease in pain. Status at last Eval: NT Current Status: 40% decrease in pain progressing   3. Patient will be independent with home exercise program for self management of symptoms   Status at last Eval: Established HEP Current Status: compliant with HEP progressing         RECOMMENDATIONS  Pt d/c due to lack of compliance with attendance. If you have any questions/comments please contact us directly at [phone]. Thank you for allowing us to assist in the care of your patient.   LPTA Signature: Nathan Roque PTA  Date: 2021 * later entry for 9/15/21   PT Signature: VIKTORIA Boston Time: 3:57 PM   NOTE TO PHYSICIAN:  PLEASE COMPLETE THE ORDERS BELOW AND FAX TO   [facility] [place] [facility]: [phone]. If you are unable to process this request in 24 hours please contact our office: [phone].    ___ I have read the above report and request that my patient continue as recommended.   ___ I have read the above report and request that my patient continue therapy with the following changes/special instructions:_________________________________________________________   ___ I have read the above report and request that my patient be discharged from therapy.      Physician Signature:        Date:       Time:                                  Juliana Ariza MD